# Patient Record
Sex: FEMALE | URBAN - METROPOLITAN AREA
[De-identification: names, ages, dates, MRNs, and addresses within clinical notes are randomized per-mention and may not be internally consistent; named-entity substitution may affect disease eponyms.]

---
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
## 2024-09-17 ENCOUNTER — APPOINTMENT (RX ONLY)
Dept: URBAN - METROPOLITAN AREA CLINIC 176 | Facility: CLINIC | Age: 19
Setting detail: DERMATOLOGY
End: 2024-09-17

## 2024-09-17 DIAGNOSIS — Z41.9 ENCOUNTER FOR PROCEDURE FOR PURPOSES OTHER THAN REMEDYING HEALTH STATE, UNSPECIFIED: ICD-10-CM

## 2024-09-17 PROCEDURE — ? HYDRAFACIAL

## 2024-09-17 ASSESSMENT — LOCATION DETAILED DESCRIPTION DERM: LOCATION DETAILED: RIGHT INFERIOR CENTRAL MALAR CHEEK

## 2024-09-17 ASSESSMENT — LOCATION SIMPLE DESCRIPTION DERM: LOCATION SIMPLE: RIGHT CHEEK

## 2024-09-17 ASSESSMENT — LOCATION ZONE DERM: LOCATION ZONE: FACE

## 2024-09-17 NOTE — PROCEDURE: HYDRAFACIAL
Solution Override
Location: face
Vacuum Pressure High Setting (Will Not Render If Set To 0): 14
Vacuum Pressure High Setting (Will Not Render If Set To 0): 22
Tip: Hydropeel Tip, Clear
Vacuum Pressure Low Setting (Will Not Render If Set To 0): 0
Solution: GlySal 7.5%
Tip: Hydropeel Tip, Teal
Procedure: Fusion
Tip: Hydropeel Tip, Blue
Procedure: Extraction
Solution: Activ-4
Consent: Written consent obtained, risks reviewed including but not limited to crusting, scabbing, blistering, scarring, darker or lighter pigmentary change, bruising, and/or incomplete response.
Price (Use Numbers Only, No Special Characters Or $): 185.00
Post-Care Instructions: I reviewed with the patient in detail post-care instructions. Patient should stay away from the sun and wear sun protection until treated areas are fully healed.
Procedure: Extend and Protect
Tip Override
Indication: pigmentation abnormalities
Treatment Number: 1
Solution: Beta-HD
Procedure: Peel
Procedure: Boost
Procedure: Exfoliation
Vacuum Pressure High Setting (Will Not Render If Set To 0): 16